# Patient Record
(demographics unavailable — no encounter records)

---

## 2025-05-05 NOTE — PHYSICAL EXAM
[MA] : MA [Well developed] : well developed [Well Nourished] : ~L well nourished [Good Hygeine] : demonstrates good hygeine [Normal Mood/Affect] : mood and affect are normal [Normal Appearance] : general appearance was normal [Aa ____] : Aa [unfilled] [Ba ____] : Ba [unfilled] [C ____] : C [unfilled] [GH ____] : GH [unfilled] [PB ____] : PB [unfilled] [TVL ____] : TVL  [unfilled] [Ap ____] : Ap [unfilled] [Bp ____] : Bp [unfilled] [Absent] : absent [Post Void Residual ____ml] : post void residual was [unfilled] ml [FreeTextEntry2] : Stephanie [Anxiety] : patient is not anxious [FreeTextEntry4] : Positive vaginal vault prolapse, enterocele

## 2025-05-05 NOTE — HISTORY OF PRESENT ILLNESS
[Vaginal Wall Prolapse] : no [Rectal Prolapse] : no [Unable To Restrain Bowel Movement] : no [Urinary Frequency] : no [de-identified] : frequently [de-identified] : daily [de-identified] : sometimes [de-identified] : 2-3 [FreeTextEntry1] : pt here for f/u saw GYN would like to f/u on her surgery which she had in  8/2023 _ TVH, RSO, APE, retropubic sling, utersacral suspension. pt wanted to f/u on POP. takes mirlax and stool softner daily for constiaption

## 2025-05-05 NOTE — DISCUSSION/SUMMARY
[FreeTextEntry1] : I reviewed the above findings.  She has apical asymptomatic recurrence of the prolapse.  We discussed no treatment is necessary she is asymptomatic with it.  With regards to her urinary symptoms we discussed that urine dipstick was positive for trace blood and we will send off urinalysis microscopy and culture.  She has overactive bladder symptoms with episodes of urge incontinence with no evidence of retention and we discussed treatment options for overactive bladder including pharmacologic therapies, noninvasive therapies and minimally invasive therapies.  She does not desire treatment for her urinary symptoms.  I UG a patient information on overactive bladder and [facility] OAB chart was given to her.  She will follow-up here in as-needed basis.  All questions were answered.